# Patient Record
Sex: MALE | Race: WHITE | NOT HISPANIC OR LATINO | URBAN - METROPOLITAN AREA
[De-identification: names, ages, dates, MRNs, and addresses within clinical notes are randomized per-mention and may not be internally consistent; named-entity substitution may affect disease eponyms.]

---

## 2017-09-14 ENCOUNTER — EMERGENCY (EMERGENCY)
Facility: HOSPITAL | Age: 67
LOS: 1 days | Discharge: HOME | End: 2017-09-14
Attending: EMERGENCY MEDICINE | Admitting: EMERGENCY MEDICINE
Payer: COMMERCIAL

## 2017-09-14 VITALS
SYSTOLIC BLOOD PRESSURE: 121 MMHG | HEART RATE: 70 BPM | DIASTOLIC BLOOD PRESSURE: 86 MMHG | RESPIRATION RATE: 18 BRPM | OXYGEN SATURATION: 98 %

## 2017-09-14 VITALS
OXYGEN SATURATION: 97 % | HEART RATE: 90 BPM | DIASTOLIC BLOOD PRESSURE: 81 MMHG | SYSTOLIC BLOOD PRESSURE: 121 MMHG | RESPIRATION RATE: 16 BRPM | WEIGHT: 149.91 LBS | TEMPERATURE: 98 F

## 2017-09-14 DIAGNOSIS — Z98.89 OTHER SPECIFIED POSTPROCEDURAL STATES: Chronic | ICD-10-CM

## 2017-09-14 DIAGNOSIS — S01.512A LACERATION WITHOUT FOREIGN BODY OF ORAL CAVITY, INITIAL ENCOUNTER: ICD-10-CM

## 2017-09-14 LAB
ALBUMIN SERPL ELPH-MCNC: 4.3 G/DL — SIGNIFICANT CHANGE UP (ref 3.3–5)
ALP SERPL-CCNC: 85 U/L — SIGNIFICANT CHANGE UP (ref 40–120)
ALT FLD-CCNC: 36 U/L RC — SIGNIFICANT CHANGE UP (ref 10–45)
ANION GAP SERPL CALC-SCNC: 14 MMOL/L — SIGNIFICANT CHANGE UP (ref 5–17)
APTT BLD: 57.1 SEC — HIGH (ref 27.5–37.4)
AST SERPL-CCNC: 29 U/L — SIGNIFICANT CHANGE UP (ref 10–40)
BASOPHILS # BLD AUTO: 0 K/UL — SIGNIFICANT CHANGE UP (ref 0–0.2)
BASOPHILS NFR BLD AUTO: 0.6 % — SIGNIFICANT CHANGE UP (ref 0–2)
BILIRUB SERPL-MCNC: 0.6 MG/DL — SIGNIFICANT CHANGE UP (ref 0.2–1.2)
BLD GP AB SCN SERPL QL: NEGATIVE — SIGNIFICANT CHANGE UP
BUN SERPL-MCNC: 18 MG/DL — SIGNIFICANT CHANGE UP (ref 7–23)
CALCIUM SERPL-MCNC: 9.3 MG/DL — SIGNIFICANT CHANGE UP (ref 8.4–10.5)
CHLORIDE SERPL-SCNC: 103 MMOL/L — SIGNIFICANT CHANGE UP (ref 96–108)
CO2 SERPL-SCNC: 25 MMOL/L — SIGNIFICANT CHANGE UP (ref 22–31)
CREAT SERPL-MCNC: 0.92 MG/DL — SIGNIFICANT CHANGE UP (ref 0.5–1.3)
EOSINOPHIL # BLD AUTO: 0.1 K/UL — SIGNIFICANT CHANGE UP (ref 0–0.5)
EOSINOPHIL NFR BLD AUTO: 1.2 % — SIGNIFICANT CHANGE UP (ref 0–6)
GLUCOSE SERPL-MCNC: 99 MG/DL — SIGNIFICANT CHANGE UP (ref 70–99)
HCT VFR BLD CALC: 46.8 % — SIGNIFICANT CHANGE UP (ref 39–50)
HGB BLD-MCNC: 15.9 G/DL — SIGNIFICANT CHANGE UP (ref 13–17)
INR BLD: 3.07 RATIO — HIGH (ref 0.88–1.16)
LYMPHOCYTES # BLD AUTO: 1.6 K/UL — SIGNIFICANT CHANGE UP (ref 1–3.3)
LYMPHOCYTES # BLD AUTO: 21.5 % — SIGNIFICANT CHANGE UP (ref 13–44)
MCHC RBC-ENTMCNC: 32 PG — SIGNIFICANT CHANGE UP (ref 27–34)
MCHC RBC-ENTMCNC: 34 GM/DL — SIGNIFICANT CHANGE UP (ref 32–36)
MCV RBC AUTO: 94.1 FL — SIGNIFICANT CHANGE UP (ref 80–100)
MONOCYTES # BLD AUTO: 0.6 K/UL — SIGNIFICANT CHANGE UP (ref 0–0.9)
MONOCYTES NFR BLD AUTO: 8.6 % — SIGNIFICANT CHANGE UP (ref 2–14)
NEUTROPHILS # BLD AUTO: 5.1 K/UL — SIGNIFICANT CHANGE UP (ref 1.8–7.4)
NEUTROPHILS NFR BLD AUTO: 68.1 % — SIGNIFICANT CHANGE UP (ref 43–77)
PLATELET # BLD AUTO: 178 K/UL — SIGNIFICANT CHANGE UP (ref 150–400)
POTASSIUM SERPL-MCNC: 4.7 MMOL/L — SIGNIFICANT CHANGE UP (ref 3.5–5.3)
POTASSIUM SERPL-SCNC: 4.7 MMOL/L — SIGNIFICANT CHANGE UP (ref 3.5–5.3)
PROT SERPL-MCNC: 7.2 G/DL — SIGNIFICANT CHANGE UP (ref 6–8.3)
PROTHROM AB SERPL-ACNC: 33.9 SEC — HIGH (ref 9.8–12.7)
RBC # BLD: 4.97 M/UL — SIGNIFICANT CHANGE UP (ref 4.2–5.8)
RBC # FLD: 12.5 % — SIGNIFICANT CHANGE UP (ref 10.3–14.5)
RH IG SCN BLD-IMP: POSITIVE — SIGNIFICANT CHANGE UP
SODIUM SERPL-SCNC: 142 MMOL/L — SIGNIFICANT CHANGE UP (ref 135–145)
WBC # BLD: 7.5 K/UL — SIGNIFICANT CHANGE UP (ref 3.8–10.5)
WBC # FLD AUTO: 7.5 K/UL — SIGNIFICANT CHANGE UP (ref 3.8–10.5)

## 2017-09-14 PROCEDURE — 80053 COMPREHEN METABOLIC PANEL: CPT

## 2017-09-14 PROCEDURE — 85730 THROMBOPLASTIN TIME PARTIAL: CPT

## 2017-09-14 PROCEDURE — 99285 EMERGENCY DEPT VISIT HI MDM: CPT | Mod: 25

## 2017-09-14 PROCEDURE — 85610 PROTHROMBIN TIME: CPT

## 2017-09-14 PROCEDURE — 86901 BLOOD TYPING SEROLOGIC RH(D): CPT

## 2017-09-14 PROCEDURE — 12001 RPR S/N/AX/GEN/TRNK 2.5CM/<: CPT

## 2017-09-14 PROCEDURE — 85027 COMPLETE CBC AUTOMATED: CPT

## 2017-09-14 PROCEDURE — 86900 BLOOD TYPING SEROLOGIC ABO: CPT

## 2017-09-14 PROCEDURE — P9017: CPT

## 2017-09-14 PROCEDURE — 99283 EMERGENCY DEPT VISIT LOW MDM: CPT | Mod: 25

## 2017-09-14 PROCEDURE — 99284 EMERGENCY DEPT VISIT MOD MDM: CPT

## 2017-09-14 PROCEDURE — 36430 TRANSFUSION BLD/BLD COMPNT: CPT

## 2017-09-14 PROCEDURE — 86850 RBC ANTIBODY SCREEN: CPT

## 2017-09-14 RX ORDER — PHYTONADIONE (VIT K1) 5 MG
5 TABLET ORAL ONCE
Qty: 0 | Refills: 0 | Status: COMPLETED | OUTPATIENT
Start: 2017-09-14 | End: 2017-09-14

## 2017-09-14 RX ORDER — TRANEXAMIC ACID 100 MG/ML
1500 INJECTION, SOLUTION INTRAVENOUS ONCE
Qty: 0 | Refills: 0 | Status: DISCONTINUED | OUTPATIENT
Start: 2017-09-14 | End: 2017-09-14

## 2017-09-14 RX ADMIN — Medication 5 MILLIGRAM(S): at 16:23

## 2017-09-14 NOTE — ED PROVIDER NOTE - PLAN OF CARE
You were seen today for bleeding from a tongue laceration that did not respond to Transhexemic acid or SurgiCel topical solutions. Your wound was cauterized with Silver Nitrate by our ENT team. Follow up with ENT in one week. Do not eat rough foods, eat only soft foods until the tongue heals. Do not brush tongue. You had partial reversal of INR. Your INR was 3.07 at this visit. You were reverse with 5mg of Vitamin K by mouth and 1 unit of fresh frozen plasma. It is important that you see your cardiologist and check your INR in order to prevent further complications from the clot in your heart. We do not want the INR to drop below the therapeutic level of 2.0. Manage your coumadin according to your cardiologist. Your cardiologist is aware of today's interventions. Return to ED for rebleed or signs of allergic reaction to your plasma transfusion- including shortness of breath, chest pain, flushing, weakness, dizziness or swelling.     1) Please follow-up with your primary care doctor within the next 3 days.  Please call today or tomorrow for an appointment.  If you cannot follow-up with your doctor(s), please return to the ED for any urgent issues.  2) If you have any worsening of symptoms or any other concerns please return to the ED immediately.  3) Please continue taking your home medications as directed by your cardiologist.   4) You may have been given a copy of your labs and/or imaging.  Please go over these with your primary care doctor.

## 2017-09-14 NOTE — CONSULT NOTE ADULT - ASSESSMENT
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

## 2017-09-14 NOTE — ED PROVIDER NOTE - PROGRESS NOTE DETAILS
surgicel applied to tongue Continued tongue bleed, spoke to Dr Espinosa cardiologist- recommendations 5 mg vitamin K, and FFP, ENT cauterized tongue bleed with silver nitrate Continued tongue bleed, spoke to Dr Espinosa cardiologist- recommendations 5 mg vitamin K, and FFP, Attempted transhaxemic acid topical, reassess Bleeding continued, called ENT, ENT cauterized tongue bleed with silver nitrate Patient seen s/p FFP. In stable condition, no respiratory changes, bleeding of tongue stopped x 2 hours. Return precautions explained

## 2017-09-14 NOTE — CONSULT NOTE ADULT - PROBLEM SELECTOR RECOMMENDATION 9
Hold in ER for 1 hour to monitor for reactivation of bleeding  F/U in office next week Hold in ER for 1 hour to monitor for reactivation of bleeding  F/U in office next week  Soft diet

## 2017-09-14 NOTE — ED ADULT NURSE REASSESSMENT NOTE - NS ED NURSE REASSESS COMMENT FT1
report taken from Baltazar MADERA. Pt. had 1 unit of platelets finished at 1900. Due for vitals at 1930. Pt. seen and revitaled by CASSY Ewign. Pt. d/c by CASSY Ewing

## 2017-09-14 NOTE — ED PROVIDER NOTE - NS ED ROS FT
CONST: no fevers, no chills, no weakness  EYES: no vision changes  ENT: + blood/laceration on tongue, no sore throat, no cough  CV: no chest pain, no palpitations  RESP: no shortness of breath  ABD: no abdominal pain, no nausea, no vomiting, no hematochezia/melena  : no dysuria, increased frequency, or hematuria  MSK: no back pain  NEURO: no headache or additional neurologic complaints  HEME: + easy bleeding

## 2017-09-14 NOTE — ED ADULT NURSE NOTE - OBJECTIVE STATEMENT
67 y/o M, reported to ED from home. A&Ox3, c/o tongue bleeding. Pt reports that the bleeding started this morning without any trauma to the area at 7am. Pt reports that he has been taking Aspirin, Plavix and Coumadin. Pt denies fall or injury.  Pt reports that his last Coumadin level was 3.1. Bleeding to the mouth is minimal currently. Pt denies LOC, SOB, C/P, N/V/D, abd pain. Pt denies fever or chills. Pt has a hx of MI and 3 stents. Will continue to monitor pt. 67 y/o M, reported to ED from home. A&Ox3, c/o tongue bleeding. Pt reports that the bleeding started this morning without any trauma to the area at 7am. Pt reports that he was eating a nut when the bleeding began. Pt reports that he has been taking Aspirin, Plavix and Coumadin. Pt denies fall or injury.  Pt reports that his last Coumadin level was 3.1. Bleeding to the mouth is minimal currently. Pt denies LOC, SOB, C/P, N/V/D, abd pain. Pt denies fever or chills. Pt has a hx of MI and 3 stents. Will continue to monitor pt.

## 2017-09-14 NOTE — ED PROVIDER NOTE - OBJECTIVE STATEMENT
67 y/o pmhx lateral MI in march, s/p MIKE x 3, on aspirin, plavix, and warfarin (cardiac thrombus dx nov 2016), c/o continuous bleeding from tongue starting at 10AM. Has tried compression and ice to the area without resolution. Denies chest pain, palpitations, weakness, dizziness, last INR 3.1 last friday, on an adjusted dosage 2.5mg (1/2 dosgae twice a week    cardiologist Jak Espinosa   PCP: dr brittany loera 67 y/o pmhx lateral MI in march, s/p MIKE x 3, on aspirin, plavix, and warfarin (cardiac thrombus dx nov 2016), c/o continuous bleeding from tongue starting at 7AM. Has tried compression and ice to the area without resolution. Denies chest pain, palpitations, weakness, dizziness, last INR 3.1 last friday, on an adjusted dosage 2.5mg (1/2 dosgae twice a week    cardiologist Jak Espinosa   PCP: dr brittany loera 65 y/o pmhx lateral MI (3/2016), s/p MIKE x 3, on aspirin, plavix, and warfarin (cardiac thrombus dx nov 2016), c/o continuous bleeding from tongue starting at 7AM. Has tried compression and ice to the area without resolution. Denies chest pain, palpitations, weakness, dizziness, last INR 3.1 last friday, on an adjusted dosage 2.5mg (1/2 dosgae twice a week    cardiologist Jesús, Jak   PCP: dr brittany loera 67 y/o pmhx lateral MI (3/2016), s/p MIKE x 3, on aspirin, plavix, and warfarin (cardiac thrombus dx nov 2016), c/o continuous bleeding from tongue starting at 7AM. Has tried compression and ice to the area without resolution. Denies chest pain, palpitations, weakness, dizziness, last INR 3.1 last friday, on an adjusted dosage 2.5mg (1/2 dosgae twice a week    Cardiologist: Haydee Espinosa   PCP: dr brittany loera 65 y/o pmhx lateral MI (3/2016), s/p MIKE x 3, on aspirin, plavix, and warfarin (cardiac thrombus dx nov 2016), c/o continuous bleeding from tongue starting at 7AM. Has tried compression and ice to the area without resolution. Denies chest pain, palpitations, weakness, or dizziness. Last INR 3.1 6 days ago. Cardiologist has recently placed him on an adjusted dosage of warfarin 2.5mg x 5 days/week (1/2 dosage twice a week)    Cardiologist: Haydee Espinosa  PCP: Aneudy Bolivar

## 2017-09-14 NOTE — ED PROVIDER NOTE - CARE PLAN
Principal Discharge DX:	Bleeding from mouth  Instructions for follow-up, activity and diet:	You were seen today for bleeding from a tongue laceration that did not respond to Transhexemic acid or SurgiCel topical solutions. Your wound was cauterized with Silver Nitrate by our ENT team. Follow up with ENT in one week. Do not eat rough foods, eat only soft foods until the tongue heals. Do not brush tongue. You had partial reversal of INR. Your INR was 3.07 at this visit. You were reverse with 5mg of Vitamin K by mouth and 1 unit of fresh frozen plasma. It is important that you see your cardiologist and check your INR in order to prevent further complications from the clot in your heart. We do not want the INR to drop below the therapeutic level of 2.0. Manage your coumadin according to your cardiologist. Your cardiologist is aware of today's interventions. Return to ED for rebleed or signs of allergic reaction to your plasma transfusion- including shortness of breath, chest pain, flushing, weakness, dizziness or swelling.     1) Please follow-up with your primary care doctor within the next 3 days.  Please call today or tomorrow for an appointment.  If you cannot follow-up with your doctor(s), please return to the ED for any urgent issues.  2) If you have any worsening of symptoms or any other concerns please return to the ED immediately.  3) Please continue taking your home medications as directed by your cardiologist.   4) You may have been given a copy of your labs and/or imaging.  Please go over these with your primary care doctor.  Secondary Diagnosis:	Laceration of tongue, initial encounter

## 2017-09-14 NOTE — ED ADULT NURSE REASSESSMENT NOTE - NS ED NURSE REASSESS COMMENT FT1
Pt's tongue is still bleeding. Pt is keeping his mouth open as was directed by MD. Pt VSS. Pt awaiting ENT consult. Will continue to monitor pt, wife at bedside.

## 2017-09-14 NOTE — CONSULT NOTE ADULT - SUBJECTIVE AND OBJECTIVE BOX
CC: Lingual Laceration    HPI: Patient is a 66 year old male w/ PMHx of lateral MI (3/2016), s/p MIKE x 3, on aspirin, plavix, and warfarin (INR 3.1 last friday) (cardiac thrombus dx nov 2016), c/o continuous bleeding from tongue starting at approx. 7AM. ENT was consulted after the ER tried compression and ice to the area without resolution. He says that he was possibly eating a walnut at the time of occurrence but was unsure if that is what caused the laceration on his tongue. He says the bleeding was of gradual onset. The laceration was approx. 1 cm long, 1 inch up from tip on superior surface and just right of midline. Silver nitrate was applied to the area and direct pressure was held for approx. 3 minutes. Patient performed full range of motion of tongue with no reactivity of bleeding. Denies chest pain, palpitations, SOB, lightheaded, weakness, epistaxis, dizziness, sore throat, nausea, vomiting, hoarseness and decreased taste.     PAST MEDICAL & SURGICAL HISTORY:  Myocardial infarction  HTN (hypertension)  DM (diabetes mellitus)  History of intravascular stent placement    Allergies    No Known Allergies    MEDICATIONS  (STANDING):  · 	losartan 25 mg oral tablet: 1 tab(s) orally once a day MDD:1  · 	metoprolol succinate 50 mg oral tablet, extended release: 1 tab(s) orally once a day MDD:1  · 	Januvia 100 mg oral tablet: 1 tab(s) orally once a day MDD:1  · 	clopidogrel 75 mg oral tablet: 1 tab(s) orally once a day MDD:1  · 	atorvastatin 80 mg oral tablet: 1 tab(s) orally once a day (at bedtime) MDD:1  · 	colchicine 0.6 mg oral tablet: 1 tab(s) orally once a day MDD:1  · 	insulin glargine: 20 unit(s) subcutaneously once a day MDD:1  · 	aspirin 81 mg oral delayed release tablet: 1 tab(s) orally once a day  · 	metFORMIN 500 mg oral tablet, extended release: 1 tab(s) orally 2 times a day    Social History: Denies ever smoking.    ROS: ENT, GI, , CV, Pulm, Neuro, Psych, MS, Heme, Endo, Constitutional; all negative except as noted in HPI    Vital Signs Last 24 Hrs  T(C): 36.4 (14 Sep 2017 12:31), Max: 36.4 (14 Sep 2017 12:31)  T(F): 97.5 (14 Sep 2017 12:31), Max: 97.5 (14 Sep 2017 12:31)  HR: 57 (14 Sep 2017 16:55) (57 - 90)  BP: 139/71 (14 Sep 2017 16:55) (121/81 - 147/88)  BP(mean): --  RR: 18 (14 Sep 2017 16:55) (16 - 18)  SpO2: 98% (14 Sep 2017 16:55) (97% - 99%)                          15.9   7.5   )-----------( 178      ( 14 Sep 2017 13:43 )             46.8    09-14    142  |  103  |  18  ----------------------------<  99  4.7   |  25  |  0.92    Ca    9.3      14 Sep 2017 13:43    TPro  7.2  /  Alb  4.3  /  TBili  0.6  /  DBili  x   /  AST  29  /  ALT  36  /  AlkPhos  85  09-14   PT/INR - ( 14 Sep 2017 13:43 )   PT: 33.9 sec;   INR: 3.07 ratio         PTT - ( 14 Sep 2017 13:43 )  PTT:57.1 sec    PHYSICAL EXAM:  Gen: NAD, well-developed  Head: Normocephalic, Atraumatic  Face: no edema/erythema/fluctuance, parotid glands soft without mass  Eyes: PERRL, EOMI, no scleral injection  Ears: Right - ear canal clear, TM intact without effusion            Left - ear canal clear, TM intact without effusion  Nose: Nares bilaterally patent, no discharge  Mouth: 1 cm laceration 1 inch from tip of tongue just right of midline, mucosa moist and pink, tongue/uvula midline, oropharynx clear, no broken teeth  Neck: Flat, supple, no lymphadenopathy, trachea midline, no masses  Resp: no use of accessory muscles, no stridor  CV: no peripheral edema/cyanosis CC: Lingual Laceration    HPI: Patient is a 66 year old male w/ PMHx of lateral MI (3/2016), s/p MIKE x 3, on aspirin, plavix, and warfarin (INR 3.1 last friday) (cardiac thrombus dx nov 2016), c/o continuous bleeding from tongue starting at approx. 7AM. ENT was consulted after the ER tried compression and ice to the area without resolution. He says that he was possibly eating a walnut at the time of occurrence but was unsure if that is what caused the laceration on his tongue. He says the bleeding was of gradual onset. The laceration was approx. 1 cm long, 1 inch up from tip on superior surface and just right of midline. Denies chest pain, palpitations, SOB, lightheaded, weakness, epistaxis, dizziness, sore throat, nausea, vomiting, hoarseness and decreased taste.     PAST MEDICAL & SURGICAL HISTORY:  Myocardial infarction  HTN (hypertension)  DM (diabetes mellitus)  History of intravascular stent placement    Allergies    No Known Allergies    MEDICATIONS  (STANDING):  · 	losartan 25 mg oral tablet: 1 tab(s) orally once a day MDD:1  · 	metoprolol succinate 50 mg oral tablet, extended release: 1 tab(s) orally once a day MDD:1  · 	Januvia 100 mg oral tablet: 1 tab(s) orally once a day MDD:1  · 	clopidogrel 75 mg oral tablet: 1 tab(s) orally once a day MDD:1  · 	atorvastatin 80 mg oral tablet: 1 tab(s) orally once a day (at bedtime) MDD:1  · 	colchicine 0.6 mg oral tablet: 1 tab(s) orally once a day MDD:1  · 	insulin glargine: 20 unit(s) subcutaneously once a day MDD:1  · 	aspirin 81 mg oral delayed release tablet: 1 tab(s) orally once a day  · 	metFORMIN 500 mg oral tablet, extended release: 1 tab(s) orally 2 times a day    Social History: Denies ever smoking.    ROS: ENT, GI, , CV, Pulm, Neuro, Psych, MS, Heme, Endo, Constitutional; all negative except as noted in HPI    Vital Signs Last 24 Hrs  T(C): 36.4 (14 Sep 2017 12:31), Max: 36.4 (14 Sep 2017 12:31)  T(F): 97.5 (14 Sep 2017 12:31), Max: 97.5 (14 Sep 2017 12:31)  HR: 57 (14 Sep 2017 16:55) (57 - 90)  BP: 139/71 (14 Sep 2017 16:55) (121/81 - 147/88)  BP(mean): --  RR: 18 (14 Sep 2017 16:55) (16 - 18)  SpO2: 98% (14 Sep 2017 16:55) (97% - 99%)                          15.9   7.5   )-----------( 178      ( 14 Sep 2017 13:43 )             46.8    09-14    142  |  103  |  18  ----------------------------<  99  4.7   |  25  |  0.92    Ca    9.3      14 Sep 2017 13:43    TPro  7.2  /  Alb  4.3  /  TBili  0.6  /  DBili  x   /  AST  29  /  ALT  36  /  AlkPhos  85  09-14   PT/INR - ( 14 Sep 2017 13:43 )   PT: 33.9 sec;   INR: 3.07 ratio         PTT - ( 14 Sep 2017 13:43 )  PTT:57.1 sec    PHYSICAL EXAM:  Gen: NAD, well-developed  Head: Normocephalic, Atraumatic  Face: no edema/erythema/fluctuance, parotid glands soft without mass  Eyes: PERRL, EOMI, no scleral injection  Nose: Nares bilaterally patent, no discharge  Mouth: 1 cm laceration 1 inch from tip of tongue just right of midline, mucosa moist and pink, tongue/uvula midline, oropharynx clear, no broken teeth  Neck: Flat, supple, no lymphadenopathy, trachea midline, no masses  Resp: no use of accessory muscles, no stridor  CV: no peripheral edema/cyanosis    Procedure- Silver nitrate was applied to the area and direct pressure was held for approx. 3 minutes. Patient performed full range of motion of tongue with no reactivity of bleeding.

## 2017-09-14 NOTE — ED PROVIDER NOTE - MEDICAL DECISION MAKING DETAILS
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 Will try transhexemic topical and surgicel to stop bleeding, will check INR, call cardiologist. 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 Will try transhexemic topical and surgicel to stop bleeding, will check INR, call cardiologist.  Attending Garza: 65 y/o male h/o CAD s/p MIKE presenting with bleeding from tongue. pt is on coumadin with report of supratherapeutic INR. no black or bloody stools. upon arrival pt with active oozing from tongue. INR elevated. no head trauma or HA to suggest intracranial process. will attempt hemostasis with surgiciil, consider tranexemic acid and reversal of INR. d/w pt's doctors who agree with vit k and FFP. will re-eval

## 2017-09-14 NOTE — ED PROVIDER NOTE - PHYSICAL EXAMINATION
Attending Garza: Gen: NAD, heent: atrauamtic, eomi, perrla, tongue with 1mm laceration with oozing, op pink, uvula midline, neck; nttp, no nuchal rigidity, chest: nttp, no crepitus, cv: rrr, no murmurs, lungs: ctab, abd: soft, nontender, nondistended, no peritoneal signs, +BS, no guarding, ext: wwp, neg homans, skin: no rash, neuro: awake and alert, following commands, speech clear, sensation and strength intact, no focal deficits Gen: AAOx3 , NAD  Head: NCAT  ENT: Airway patent, moist mucous membranes, nasal passageways clear, conjunctiva pink, EOMI, no scleral icterus, small slow bleeding laceration on tongue  Cardiac: Normal rate, normal rhythm, no murmurs appreciated  Respiratory: Lungs CTA B/L  Gastrointestinal: +BS, Abdomen soft, nontender, nondistended, no rebound  HEME: Extremities warm, pulses intact and symmetrical in all four extremities, good capillary refill  Skin: No rashes, no lesions  Neuro: No gross neurologic deficits      Attending Garza: Gen: NAD, heent: atrauamtic, eomi, perrla, tongue with 1mm laceration with oozing, op pink, uvula midline, neck; nttp, no nuchal rigidity, chest: nttp, no crepitus, cv: rrr, no murmurs, lungs: ctab, abd: soft, nontender, nondistended, no peritoneal signs, +BS, no guarding, ext: wwp, neg homans, skin: no rash, neuro: awake and alert, following commands, speech clear, sensation and strength intact, no focal deficits

## 2023-06-15 ENCOUNTER — OUTPATIENT (OUTPATIENT)
Dept: OUTPATIENT SERVICES | Facility: HOSPITAL | Age: 73
LOS: 1 days | End: 2023-06-15
Payer: COMMERCIAL

## 2023-06-15 VITALS
RESPIRATION RATE: 20 BRPM | OXYGEN SATURATION: 99 % | SYSTOLIC BLOOD PRESSURE: 113 MMHG | DIASTOLIC BLOOD PRESSURE: 63 MMHG | HEART RATE: 62 BPM

## 2023-06-15 VITALS
HEIGHT: 68 IN | SYSTOLIC BLOOD PRESSURE: 126 MMHG | HEART RATE: 56 BPM | RESPIRATION RATE: 24 BRPM | DIASTOLIC BLOOD PRESSURE: 63 MMHG | WEIGHT: 149.91 LBS | OXYGEN SATURATION: 98 % | TEMPERATURE: 97 F

## 2023-06-15 DIAGNOSIS — Z98.89 OTHER SPECIFIED POSTPROCEDURAL STATES: Chronic | ICD-10-CM

## 2023-06-15 DIAGNOSIS — Z12.11 ENCOUNTER FOR SCREENING FOR MALIGNANT NEOPLASM OF COLON: ICD-10-CM

## 2023-06-15 LAB — GLUCOSE BLDC GLUCOMTR-MCNC: 117 MG/DL — HIGH (ref 70–99)

## 2023-06-15 PROCEDURE — 88305 TISSUE EXAM BY PATHOLOGIST: CPT

## 2023-06-15 PROCEDURE — 82962 GLUCOSE BLOOD TEST: CPT

## 2023-06-15 PROCEDURE — 45380 COLONOSCOPY AND BIOPSY: CPT | Mod: PT

## 2023-06-15 PROCEDURE — 88305 TISSUE EXAM BY PATHOLOGIST: CPT | Mod: 26

## 2023-06-15 DEVICE — NET RETRV ROT ROTH 2.5MMX230CM: Type: IMPLANTABLE DEVICE | Status: FUNCTIONAL

## 2023-06-15 RX ORDER — APIXABAN 2.5 MG/1
1 TABLET, FILM COATED ORAL
Refills: 0 | DISCHARGE

## 2023-06-15 RX ORDER — SODIUM CHLORIDE 9 MG/ML
500 INJECTION INTRAMUSCULAR; INTRAVENOUS; SUBCUTANEOUS
Refills: 0 | Status: COMPLETED | OUTPATIENT
Start: 2023-06-15 | End: 2023-06-15

## 2023-06-15 RX ORDER — INSULIN GLARGINE 100 [IU]/ML
6 INJECTION, SOLUTION SUBCUTANEOUS
Refills: 0 | DISCHARGE

## 2023-06-15 RX ADMIN — SODIUM CHLORIDE 30 MILLILITER(S): 9 INJECTION INTRAMUSCULAR; INTRAVENOUS; SUBCUTANEOUS at 09:30

## 2023-06-15 NOTE — ASU DISCHARGE PLAN (ADULT/PEDIATRIC) - NSDISCH_INCISION_ENDO_ALL_CORE_FT
Health Maintenance Due   Topic Date Due   • Colorectal Cancer Screening-Colonoscopy  01/01/2018   • Influenza Vaccine (1) 09/01/2019   • Medicare Wellness 65+  12/04/2019   • Breast Cancer Screening  12/28/2019       Patient is due for the topics as listed above and wishes to proceed with them. Orders placed for Immunization(s) Influenza, Breast Cancer Screening. Refuses Cologuard.         If an incision was performed as part of your procedure, contact your doctor with any pain, swelling, redness, or other concerns you may have about that area.

## 2023-06-15 NOTE — PRE PROCEDURE NOTE - PRE PROCEDURE EVALUATION
Attending Physician:       Montrell Toledo                     Procedure: Colonoscopy    Indication for Procedure:  Colon screening  ________________________________________________________  PAST MEDICAL & SURGICAL HISTORY:  DM (diabetes mellitus)      HTN (hypertension)      Myocardial infarction      History of intravascular stent placement        ALLERGIES:  No Known Allergies    HOME MEDICATIONS:  metoprolol, metformin, losartan, Eliquis, Januvia, insulin, Lipitor, Plavix      AICD/PPM: [ ] yes   [x ] no    PERTINENT LAB DATA:  CMP nl except glu 123, hgb A1C 6.4, CBC nl                      PHYSICAL EXAMINATION:    T(C): --  36.1  HR: --  58  BP: --  104/68  SpO2: -- 96%      Respiratory: CTAB/L  Cardiovascular: S1 and S2  Gastrointestinal: BS+, soft, NT/ND      ASA Class: I [ ]  II [ ]  III [x ]  IV [ ]    COMMENTS:    The patient is a suitable candidate for the planned procedure unless box checked [ ]  No, explain:

## 2023-06-19 LAB — SURGICAL PATHOLOGY STUDY: SIGNIFICANT CHANGE UP

## (undated) DEVICE — TUBING CAP SET ENDO 24HR USE GI

## (undated) DEVICE — SYR LUER LOK 50CC

## (undated) DEVICE — TUBING SUCTION CONN 6FT STERILE

## (undated) DEVICE — TUBING IV SET GRAVITY 3Y 100" MACRO

## (undated) DEVICE — TUBING SUCTION 20FT

## (undated) DEVICE — CATH IV SAFE BC 22G X 1" (BLUE)

## (undated) DEVICE — FOLEY HOLDER STATLOCK 2 WAY ADULT

## (undated) DEVICE — SNARE POLYP SENS 27MM 240CM

## (undated) DEVICE — BIOPSY FORCEP RADIAL JAW 4 STANDARD WITH NEEDLE

## (undated) DEVICE — FORCEP RADIAL JAW 4 JUMBO 2.8MM 3.2MM 240CM ORANGE DISP

## (undated) DEVICE — SNARE POLYP SENS SM 13MM 240CM

## (undated) DEVICE — POLY TRAP ETRAP

## (undated) DEVICE — FORCEP RADIAL JAW 4 HOT BIOPSY DISP

## (undated) DEVICE — CATH IV SAFE BC 20G X 1.16" (PINK)

## (undated) DEVICE — ELCTR GROUNDING PAD ADULT COVIDIEN

## (undated) DEVICE — SUCTION YANKAUER NO CONTROL VENT

## (undated) DEVICE — PACK IV START WITH CHG

## (undated) DEVICE — TRAP SPECIMEN SPUTUM 40CC

## (undated) DEVICE — SNARE POLYP MINI ACCUSNR 1.5 X 3CM

## (undated) DEVICE — CLAMP BX HOT RAD JAW 3

## (undated) DEVICE — Device

## (undated) DEVICE — SOL INJ NS 0.9% 500ML 2 PORT

## (undated) DEVICE — BRUSH COLONOSCOPY CYTOLOGY

## (undated) DEVICE — SENSOR O2 FINGER ADULT

## (undated) DEVICE — IRRIGATOR BIO SHIELD